# Patient Record
Sex: MALE | Race: WHITE
[De-identification: names, ages, dates, MRNs, and addresses within clinical notes are randomized per-mention and may not be internally consistent; named-entity substitution may affect disease eponyms.]

---

## 2020-07-22 ENCOUNTER — NURSE TRIAGE (OUTPATIENT)
Dept: OTHER | Facility: CLINIC | Age: 30
End: 2020-07-22

## 2020-07-22 NOTE — TELEPHONE ENCOUNTER
Call on COVID line: The patient has had a fever (high has been 101.5 and currently is 98.4)  for 3 days, diarrhea, vomited X 2, fatigue, and headache. Protocol recommendation shared to see PCP but does not have one shared phone tree for testing. COVIDand care advice shared.      Reason for Disposition   [1] COVID-19 infection suspected by caller or triager AND [2] mild symptoms (cough, fever, or others) AND [0] no complications or SOB    Protocols used: CORONAVIRUS (COVID-19) DIAGNOSED OR SUSPECTED-[facility]